# Patient Record
Sex: MALE | Race: WHITE
[De-identification: names, ages, dates, MRNs, and addresses within clinical notes are randomized per-mention and may not be internally consistent; named-entity substitution may affect disease eponyms.]

---

## 2018-05-14 ENCOUNTER — HOSPITAL ENCOUNTER (OUTPATIENT)
Dept: HOSPITAL 61 - SLPLAB | Age: 48
Discharge: HOME | End: 2018-05-14
Attending: FAMILY MEDICINE
Payer: COMMERCIAL

## 2018-05-14 DIAGNOSIS — G47.33: Primary | ICD-10-CM

## 2018-05-14 DIAGNOSIS — R06.83: ICD-10-CM

## 2018-05-14 PROCEDURE — 95810 POLYSOM 6/> YRS 4/> PARAM: CPT

## 2018-05-14 RX ADMIN — OXYMETAZOLINE HYDROCHLORIDE 1 SPRAY: 5 SPRAY NASAL at 21:30

## 2021-09-01 ENCOUNTER — HOSPITAL ENCOUNTER (EMERGENCY)
Dept: HOSPITAL 61 - ER | Age: 51
Discharge: HOME | End: 2021-09-01
Payer: COMMERCIAL

## 2021-09-01 VITALS — HEIGHT: 68 IN | WEIGHT: 280.43 LBS | BODY MASS INDEX: 42.5 KG/M2

## 2021-09-01 VITALS — DIASTOLIC BLOOD PRESSURE: 70 MMHG | SYSTOLIC BLOOD PRESSURE: 138 MMHG

## 2021-09-01 DIAGNOSIS — R31.9: Primary | ICD-10-CM

## 2021-09-01 DIAGNOSIS — R06.02: ICD-10-CM

## 2021-09-01 DIAGNOSIS — R10.32: ICD-10-CM

## 2021-09-01 DIAGNOSIS — R09.81: ICD-10-CM

## 2021-09-01 DIAGNOSIS — Z88.0: ICD-10-CM

## 2021-09-01 DIAGNOSIS — Z88.2: ICD-10-CM

## 2021-09-01 DIAGNOSIS — R50.9: ICD-10-CM

## 2021-09-01 LAB
ANION GAP SERPL CALC-SCNC: 8 MMOL/L (ref 6–14)
APTT PPP: (no result) S
BACTERIA #/AREA URNS HPF: 0 /HPF
BASOPHILS # BLD AUTO: 0 X10^3/UL (ref 0–0.2)
BASOPHILS NFR BLD: 1 % (ref 0–3)
BILIRUB UR QL STRIP: NEGATIVE
BUN SERPL-MCNC: 16 MG/DL (ref 8–26)
CALCIUM SERPL-MCNC: 8.5 MG/DL (ref 8.5–10.1)
CHLORIDE SERPL-SCNC: 99 MMOL/L (ref 98–107)
CO2 SERPL-SCNC: 29 MMOL/L (ref 21–32)
CREAT SERPL-MCNC: 2.2 MG/DL (ref 0.7–1.3)
EOSINOPHIL NFR BLD: 0 X10^3/UL (ref 0–0.7)
EOSINOPHIL NFR BLD: 1 % (ref 0–3)
ERYTHROCYTE [DISTWIDTH] IN BLOOD BY AUTOMATED COUNT: 14 % (ref 11.5–14.5)
FIBRINOGEN PPP-MCNC: (no result) MG/DL
GFR SERPLBLD BASED ON 1.73 SQ M-ARVRAT: 31.8 ML/MIN
GLUCOSE SERPL-MCNC: 122 MG/DL (ref 70–99)
HCT VFR BLD CALC: 37.2 % (ref 39–53)
HGB BLD-MCNC: 13.1 G/DL (ref 13–17.5)
LYMPHOCYTES # BLD: 0.6 X10^3/UL (ref 1–4.8)
LYMPHOCYTES NFR BLD AUTO: 20 % (ref 24–48)
MCH RBC QN AUTO: 31 PG (ref 25–35)
MCHC RBC AUTO-ENTMCNC: 35 G/DL (ref 31–37)
MCV RBC AUTO: 87 FL (ref 79–100)
MONO #: 0.5 X10^3/UL (ref 0–1.1)
MONOCYTES NFR BLD: 15 % (ref 0–9)
NEUT #: 2 X10^3/UL (ref 1.8–7.7)
NEUTROPHILS NFR BLD AUTO: 64 % (ref 31–73)
NITRITE UR QL STRIP: NEGATIVE
PH UR STRIP: 6 [PH]
PLATELET # BLD AUTO: 156 X10^3/UL (ref 140–400)
POTASSIUM SERPL-SCNC: 4.3 MMOL/L (ref 3.5–5.1)
PROT UR STRIP-MCNC: 100 MG/DL
RBC # BLD AUTO: 4.28 X10^6/UL (ref 4.3–5.7)
RBC #/AREA URNS HPF: (no result) /HPF (ref 0–2)
SODIUM SERPL-SCNC: 136 MMOL/L (ref 136–145)
UROBILINOGEN UR-MCNC: 0.2 MG/DL
WBC # BLD AUTO: 3.2 X10^3/UL (ref 4–11)
WBC #/AREA URNS HPF: (no result) /HPF (ref 0–4)
YEAST #/AREA URNS HPF: PRESENT /HPF

## 2021-09-01 PROCEDURE — 87086 URINE CULTURE/COLONY COUNT: CPT

## 2021-09-01 PROCEDURE — 74176 CT ABD & PELVIS W/O CONTRAST: CPT

## 2021-09-01 PROCEDURE — 99285 EMERGENCY DEPT VISIT HI MDM: CPT

## 2021-09-01 PROCEDURE — 80048 BASIC METABOLIC PNL TOTAL CA: CPT

## 2021-09-01 PROCEDURE — 36415 COLL VENOUS BLD VENIPUNCTURE: CPT

## 2021-09-01 PROCEDURE — 85025 COMPLETE CBC W/AUTO DIFF WBC: CPT

## 2021-09-01 PROCEDURE — 81001 URINALYSIS AUTO W/SCOPE: CPT

## 2021-09-01 PROCEDURE — 96360 HYDRATION IV INFUSION INIT: CPT

## 2021-09-01 PROCEDURE — 71045 X-RAY EXAM CHEST 1 VIEW: CPT

## 2021-09-01 PROCEDURE — 96361 HYDRATE IV INFUSION ADD-ON: CPT

## 2021-09-01 NOTE — RAD
Site ID: [id number]



EXAMINATION: CT ABDOMEN+PELVIS WO.



Technique: Axial images with coronal and sagittal reconstructions are performed of abdomen and pelvis
 without contrast.



One or more of the following radiation dose reduction techniques was used: automated exposure control
, adjustment of mA and/or KV according to patient size, and/or utilization of iterative reconstructio
n technique.





HISTORY: 51 years  Male  Reason: abdominal pain, llq, hematuria 



COMPARISON:  None.



FINDINGS:

There is minimal focus of groundglass opacity in the inferior aspect of the lingula measuring 1.4 cm 
in size of uncertain etiology.



There is diffuse hepatic steatosis. The spleen is a slightly enlarged measuring 15 x 5.5 cm and 13 cm
 craniocaudally. The pancreas, and adrenals appear unremarkable. The gallbladder is not seen presumab
ly related to prior cholecystectomy. Correlate to surgical history.



The kidneys demonstrate no hydronephrosis. There are from 2 stones in the lower from pole of the righ
t kidney measuring up to 4 mm in size. The urinary bladder appear unremarkable. No left kidney or ure
teric stones.



There are sutures seen near the base of the cecum, presumably related to prior appendectomy.



The abdominal aorta is normal in caliber. No para-aortic significantly enlarged lymph nodes seen. No 
significant free fluid or fluid collection in the abdomen or pelvis noted.



There is an indeterminate soft tissue nodule seen anteriorly in the upper abdomen on the left side wi
th possible attachment to abdominal wall musculature. It has smooth margins and it is probably of duncan
ign nature. Mildly enlarged mesenteric lymph nodes up to 1 cm in short axis is seen of uncertain sign
ificance.



The osseous structures demonstrate no significant abnormality.



IMPRESSION:

1. Nonobstructive the lower pole right kidney stones up to 4 mm in size. No hydronephrosis.

2. Mild splenomegaly.

3. Hepatic steatosis.

4. Mild enlargement of mesenteric lymph nodes.

5. Indeterminant smoothly marginated soft tissue nodule in the upper anterior aspect of the abdomen o
n the left side.

6. Groundglass nodule measuring 1.4 cm in the lower from aspect of the lingula in the lung base.

8. Follow-up CT scan of the abdomen in 6 months is recommended to reevaluate the findings above is cantu
ggested.



Electronically signed by: Herson Lee MD (9/1/2021 3:16 PM) [id number]

## 2021-09-01 NOTE — RAD
AP chest.



HISTORY: Short of breath



AP view was taken of the chest. Heart is normal in size. There is no pleural effusion. There are no d
efinite infiltrates. The patient's taken a poor inspiration.



IMPRESSION:

1. Poor inspiration.

2. No acute infiltrates.



Electronically signed by: Conrad Delcid MD (9/1/2021 2:47 PM) [facility]

## 2021-09-01 NOTE — PHYS DOC
General Adult


EDM:


Chief Complaint:  BLOOD IN URINE





HPI:


HPI:


51-year-old male who was recently diagnosed with coronavirus presents to the 

emergency department complaining of hematuria.  He describes a hematuria as 

throughout his urinary stream, change in color from dark brown to purple 

intermittently.  He reports some mild pain with urination, he also complains of 

some left flank and left lower quadrant abdominal pain with the urination.  He 

also complains of fever, shortness of breath, congestion from his viral illness.

 He has not been vaccinated for Covid.  The patient denies nausea, vomiting, 

chest pain,  abdominal pain, urinary symptoms, stool change, recent trauma, or 

any other complaints.





Review of Systems:


Review of Systems:


ROS is otherwise negative except for what was mentioned in HPI





Heart Score:


C/O Chest Pain:  No





Current Medications:





Current Medications








 Medications


  (Trade)  Dose


 Ordered  Sig/Claudette  Start Time


 Stop Time Status Last Admin


Dose Admin


 


 Acetaminophen


  (Tylenol)  1,000 mg  1X  ONCE  9/1/21 14:15


 9/1/21 14:16 UNV  





 


 Morphine Sulfate


  (Morphine


 Sulfate)  5 mg  1X  ONCE  9/1/21 14:15


 9/1/21 14:16 UNV  





 


 Sodium Chloride  1,000 ml @ 


 1,000 mls/hr  1X  ONCE  9/1/21 14:15


 9/1/21 15:14 UNV  














Allergies:


Allergies:





Allergies








Coded Allergies Type Severity Reaction Last Updated Verified


 


  Penicillins Allergy Severe SHORTNESS OF BREATH 5/14/18 Yes


 


  Sulfa (Sulfonamide Antibiotics) Allergy Intermediate SWELLING, ITCHING 5/14/18

Yes











Physical Exam:


PE:


Constitutional: No acute distress, non-toxic appearance.  Warm to touch


HENT: Atraumatic, bilateral external ears normal, nose normal.


Eyes: PERRLA, EOMI, conjunctiva normal, no discharge.


Neck: Normal range of motion, supple, no stridor.


Cardiovascular: Heart rate regular rhythm. 2+ radial pulses


Lungs & Thorax: No respiratory distress, symmetrical expansion.


Abdomen: [Soft, left lower quadrant tenderness


Skin: Warm, dry.


Extremities: No tenderness, no cyanosis, ROM intact, no edema.


Neurologic: Alert and oriented X 3, normal motor function, normal sensory 

function, no focal deficits noted. Non ataxic gait. GCS 15.


Psychologic: Affect normal, judgment normal, mood normal.





Current Patient Data:


Labs:





Laboratory Tests








Test


 9/1/21


14:20 9/1/21


16:54


 


White Blood Count


 3.2 x10^3/uL


(4.0-11.0) 





 


Red Blood Count


 4.28 x10^6/uL


(4.30-5.70) 





 


Hemoglobin


 13.1 g/dL


(13.0-17.5) 





 


Hematocrit


 37.2 %


(39.0-53.0) 





 


Mean Corpuscular Volume 87 fL ()  


 


Mean Corpuscular Hemoglobin 31 pg (25-35)  


 


Mean Corpuscular Hemoglobin


Concent 35 g/dL


(31-37) 





 


Red Cell Distribution Width


 14.0 %


(11.5-14.5) 





 


Platelet Count


 156 x10^3/uL


(140-400) 





 


Neutrophils (%) (Auto) 64 % (31-73)  


 


Lymphocytes (%) (Auto) 20 % (24-48)  


 


Monocytes (%) (Auto) 15 % (0-9)  


 


Eosinophils (%) (Auto) 1 % (0-3)  


 


Basophils (%) (Auto) 1 % (0-3)  


 


Neutrophils # (Auto)


 2.0 x10^3/uL


(1.8-7.7) 





 


Lymphocytes # (Auto)


 0.6 x10^3/uL


(1.0-4.8) 





 


Monocytes # (Auto)


 0.5 x10^3/uL


(0.0-1.1) 





 


Eosinophils # (Auto)


 0.0 x10^3/uL


(0.0-0.7) 





 


Basophils # (Auto)


 0.0 x10^3/uL


(0.0-0.2) 





 


Sodium Level


 136 mmol/L


(136-145) 





 


Potassium Level


 4.3 mmol/L


(3.5-5.1) 





 


Chloride Level


 99 mmol/L


() 





 


Carbon Dioxide Level


 29 mmol/L


(21-32) 





 


Anion Gap 8 (6-14)  


 


Blood Urea Nitrogen


 16 mg/dL


(8-26) 





 


Creatinine


 2.2 mg/dL


(0.7-1.3) 





 


Estimated GFR


(Cockcroft-Gault) 31.8 


 





 


Glucose Level


 122 mg/dL


(70-99) 





 


Calcium Level


 8.5 mg/dL


(8.5-10.1) 





 


Urine Collection Type  Unknown 


 


Urine Color  Alva 


 


Urine Clarity  Cloudy 


 


Urine pH  6.0 (<5.0-8.0) 


 


Urine Specific Gravity


 


 1.020


(1.000-1.030)


 


Urine Protein


 


 100 mg/dL


(NEG-TRACE)


 


Urine Glucose (UA)


 


 Negative mg/dL


(NEG)


 


Urine Ketones (Stick)


 


 Trace mg/dL


(NEG)


 


Urine Blood  Large (NEG) 


 


Urine Nitrite  Negative (NEG) 


 


Urine Bilirubin  Negative (NEG) 


 


Urine Urobilinogen Dipstick


 


 0.2 mg/dL (0.2


mg/dL)


 


Urine Leukocyte Esterase  Small (NEG) 


 


Urine RBC


 


 Tntc /HPF


(0-2)


 


Urine WBC  1-4 /HPF (0-4) 


 


Urine Bacteria  0 /HPF (0-FEW) 


 


Urine Yeast  Present /HPF 








Vital Signs:





Vital Signs








  Date Time  Temp Pulse Resp B/P (MAP) Pulse Ox O2 Delivery O2 Flow Rate FiO2


 


9/1/21 16:00  94  154/78 (103) 95   


 


9/1/21 15:00  99  146/80 (102) 95   


 


9/1/21 14:00 102.5 104 20 129/79 95   





 102.5       











Radiology/Procedures:


Radiology/Procedures:


EXAMINATION: CT ABDOMEN+PELVIS WO.





Technique: Axial images with coronal and sagittal reconstructions are performed 

of abdomen and pelvis without contrast.





One or more of the following radiation dose reduction techniques was used: 

automated exposure control, adjustment of mA and/or KV according to patient 

size, and/or utilization of iterative reconstruction technique.








HISTORY: 51 years  Male  Reason: abdominal pain, llq, hematuria 





COMPARISON:  None.





FINDINGS:


There is minimal focus of groundglass opacity in the inferior aspect of the 

lingula measuring 1.4 cm in size of uncertain etiology.





There is diffuse hepatic steatosis. The spleen is a slightly enlarged measuring 

15 x 5.5 cm and 13 cm craniocaudally. The pancreas, and adrenals appear 

unremarkable. The gallbladder is not seen presumably related to prior 

cholecystectomy. Correlate to surgical history.





The kidneys demonstrate no hydronephrosis. There are from 2 stones in the lower 

from pole of the right kidney measuring up to 4 mm in size. The urinary bladder 

appear unremarkable. No left kidney or ureteric stones.





There are sutures seen near the base of the cecum, presumably related to prior 

appendectomy.





The abdominal aorta is normal in caliber. No para-aortic significantly enlarged 

lymph nodes seen. No significant free fluid or fluid collection in the abdomen 

or pelvis noted.





There is an indeterminate soft tissue nodule seen anteriorly in the upper 

abdomen on the left side with possible attachment to abdominal wall musculature.

 It has smooth margins and it is probably of benign nature. Mildly enlarged 

mesenteric lymph nodes up to 1 cm in short axis is seen of uncertain 

significance.





The osseous structures demonstrate no significant abnormality.





IMPRESSION:


1. Nonobstructive the lower pole right kidney stones up to 4 mm in size. No 

hydronephrosis.


2. Mild splenomegaly.


3. Hepatic steatosis.


4. Mild enlargement of mesenteric lymph nodes.


5. Indeterminant smoothly marginated soft tissue nodule in the upper anterior 

aspect of the abdomen on the left side.


6. Groundglass nodule measuring 1.4 cm in the lower from aspect of the lingula 

in the lung base.


8. Follow-up CT scan of the abdomen in 6 months is recommended to reevaluate the

 findings above is suggested.





Electronically signed by: Herson Lee MD (9/1/2021 3:16 PM)





AP chest.





HISTORY: Short of breath





AP view was taken of the chest. Heart is normal in size. There is no pleural 

effusion. There are no definite infiltrates. The patient's taken a poor 

inspiration.





IMPRESSION:


1. Poor inspiration.


2. No acute infiltrates.





Electronically signed by: Conrad Delcid MD (9/1/2021 2:47 PM)





Course & Med Decision Making:


Course & Med Decision Making





Vital Signs








  Date Time  Temp Pulse Resp B/P (MAP) Pulse Ox O2 Delivery O2 Flow Rate FiO2


 


9/1/21 16:48 100.6       





 100.6       


 


9/1/21 16:00  94  154/78 (103) 95   


 


9/1/21 15:00  99  146/80 (102) 95   


 


9/1/21 14:00 102.5 104 20 129/79 95   





 102.5       











Patient's labs are as above including a creatinine of 2.2 GFR 31.  He had a 

large amount of  bloody appearing urine, but no cause was seen on CT.  Patient 

was offered admission to the hospital, he would rather follow-up outpatient with

 his doctor.  We will refer him to a urologist.  I urged him to return if he has

 any progressive symptoms or symptoms do not change.  His vitals are improved as

 above.  He otherwise has no further complaints.  He did mention that he has a 

history of chronic kidney disease, so this lab result today may very well be his

 baseline, although he does not know the number specifically.  He was advised to

 continue to hydrate at home.  He mentioned that he drinks lots of [employer] 

energy drinks and does not drink enough water at home.  He states that he will 

try to drink her water, follow-up with his physician in 48 hours and try to get 

repeat blood work as we discussed in 48 hours to compare to today's results





Departure


Departure


Impression:  


   Primary Impression:  


   Hematuria


Disposition:  01 HOME / SELF CARE / HOMELESS


Condition:  STABLE


Referrals:  


KARLEY LAMB MD (PCP)


Patient Instructions:  Hematuria, Adult





Additional Instructions:  


Please follow-up with a urologist as well as your primary care doctor.  You need

 to have blood work done in 48 hours to compare to today's results.  Your GFR 

was 31 and your creatinine was 2.2.  Please get repeat blood work to compare.  

Please drink plenty of water at home, avoid caffeinated drinks, and remember to 

stay hydrated.  You are offered admission to the hospital today but with shared 

decision-making we will attempt to manage her condition outpatient.  If your 

condition changes at all, you welcome to return to the emergency department for 

further care.








 Urology


Located in: [facility]


Address: [address] [place] [address] [address], [place] [address]


Phone: [phone]











GRACE ARREDONDO DO              Sep 1, 2021 14:20

## 2021-09-11 ENCOUNTER — HOSPITAL ENCOUNTER (INPATIENT)
Dept: HOSPITAL 61 - ER | Age: 51
LOS: 2 days | Discharge: HOME | DRG: 177 | End: 2021-09-13
Attending: INTERNAL MEDICINE | Admitting: INTERNAL MEDICINE
Payer: COMMERCIAL

## 2021-09-11 VITALS — WEIGHT: 263.45 LBS | BODY MASS INDEX: 39.93 KG/M2 | HEIGHT: 68 IN

## 2021-09-11 DIAGNOSIS — Z88.2: ICD-10-CM

## 2021-09-11 DIAGNOSIS — Z86.711: ICD-10-CM

## 2021-09-11 DIAGNOSIS — Z87.891: ICD-10-CM

## 2021-09-11 DIAGNOSIS — J96.01: ICD-10-CM

## 2021-09-11 DIAGNOSIS — Z91.013: ICD-10-CM

## 2021-09-11 DIAGNOSIS — Z79.01: ICD-10-CM

## 2021-09-11 DIAGNOSIS — Z91.041: ICD-10-CM

## 2021-09-11 DIAGNOSIS — J12.82: ICD-10-CM

## 2021-09-11 DIAGNOSIS — U07.1: Primary | ICD-10-CM

## 2021-09-11 DIAGNOSIS — Z90.49: ICD-10-CM

## 2021-09-11 DIAGNOSIS — Z86.718: ICD-10-CM

## 2021-09-11 DIAGNOSIS — G47.33: ICD-10-CM

## 2021-09-11 DIAGNOSIS — E66.01: ICD-10-CM

## 2021-09-11 DIAGNOSIS — Z88.0: ICD-10-CM

## 2021-09-11 LAB
ANION GAP SERPL CALC-SCNC: 10 MMOL/L (ref 6–14)
BASOPHILS # BLD AUTO: 0 X10^3/UL (ref 0–0.2)
BASOPHILS NFR BLD: 1 % (ref 0–3)
BUN SERPL-MCNC: 17 MG/DL (ref 8–26)
CALCIUM SERPL-MCNC: 8.6 MG/DL (ref 8.5–10.1)
CHLORIDE SERPL-SCNC: 103 MMOL/L (ref 98–107)
CO2 SERPL-SCNC: 27 MMOL/L (ref 21–32)
CREAT SERPL-MCNC: 1.7 MG/DL (ref 0.7–1.3)
EOSINOPHIL NFR BLD: 0.1 X10^3/UL (ref 0–0.7)
EOSINOPHIL NFR BLD: 3 % (ref 0–3)
ERYTHROCYTE [DISTWIDTH] IN BLOOD BY AUTOMATED COUNT: 14.1 % (ref 11.5–14.5)
GFR SERPLBLD BASED ON 1.73 SQ M-ARVRAT: 42.9 ML/MIN
GLUCOSE SERPL-MCNC: 81 MG/DL (ref 70–99)
HCT VFR BLD CALC: 34.3 % (ref 39–53)
HGB BLD-MCNC: 12.4 G/DL (ref 13–17.5)
LYMPHOCYTES # BLD: 1.2 X10^3/UL (ref 1–4.8)
LYMPHOCYTES NFR BLD AUTO: 28 % (ref 24–48)
MCH RBC QN AUTO: 30 PG (ref 25–35)
MCHC RBC AUTO-ENTMCNC: 36 G/DL (ref 31–37)
MCV RBC AUTO: 83 FL (ref 79–100)
MONO #: 0.4 X10^3/UL (ref 0–1.1)
MONOCYTES NFR BLD: 10 % (ref 0–9)
NEUT #: 2.6 X10^3/UL (ref 1.8–7.7)
NEUTROPHILS NFR BLD AUTO: 59 % (ref 31–73)
PLATELET # BLD AUTO: 231 X10^3/UL (ref 140–400)
POTASSIUM SERPL-SCNC: 3.4 MMOL/L (ref 3.5–5.1)
RBC # BLD AUTO: 4.11 X10^6/UL (ref 4.3–5.7)
SODIUM SERPL-SCNC: 140 MMOL/L (ref 136–145)
WBC # BLD AUTO: 4.4 X10^3/UL (ref 4–11)

## 2021-09-11 PROCEDURE — 87426 SARSCOV CORONAVIRUS AG IA: CPT

## 2021-09-11 PROCEDURE — 36415 COLL VENOUS BLD VENIPUNCTURE: CPT

## 2021-09-11 PROCEDURE — 96365 THER/PROPH/DIAG IV INF INIT: CPT

## 2021-09-11 PROCEDURE — 87040 BLOOD CULTURE FOR BACTERIA: CPT

## 2021-09-11 PROCEDURE — 85025 COMPLETE CBC W/AUTO DIFF WBC: CPT

## 2021-09-11 PROCEDURE — 83880 ASSAY OF NATRIURETIC PEPTIDE: CPT

## 2021-09-11 PROCEDURE — 96375 TX/PRO/DX INJ NEW DRUG ADDON: CPT

## 2021-09-11 PROCEDURE — 71045 X-RAY EXAM CHEST 1 VIEW: CPT

## 2021-09-11 PROCEDURE — 94618 PULMONARY STRESS TESTING: CPT

## 2021-09-11 PROCEDURE — 85379 FIBRIN DEGRADATION QUANT: CPT

## 2021-09-11 PROCEDURE — 93005 ELECTROCARDIOGRAM TRACING: CPT

## 2021-09-11 PROCEDURE — U0003 INFECTIOUS AGENT DETECTION BY NUCLEIC ACID (DNA OR RNA); SEVERE ACUTE RESPIRATORY SYNDROME CORONAVIRUS 2 (SARS-COV-2) (CORONAVIRUS DISEASE [COVID-19]), AMPLIFIED PROBE TECHNIQUE, MAKING USE OF HIGH THROUGHPUT TECHNOLOGIES AS DESCRIBED BY CMS-2020-01-R: HCPCS

## 2021-09-11 PROCEDURE — 80048 BASIC METABOLIC PNL TOTAL CA: CPT

## 2021-09-11 PROCEDURE — 96361 HYDRATE IV INFUSION ADD-ON: CPT

## 2021-09-11 PROCEDURE — G0378 HOSPITAL OBSERVATION PER HR: HCPCS

## 2021-09-11 PROCEDURE — 83735 ASSAY OF MAGNESIUM: CPT

## 2021-09-11 PROCEDURE — 84484 ASSAY OF TROPONIN QUANT: CPT

## 2021-09-11 PROCEDURE — 83605 ASSAY OF LACTIC ACID: CPT

## 2021-09-11 RX ADMIN — BACITRACIN ONE MLS/HR: 5000 INJECTION, POWDER, FOR SOLUTION INTRAMUSCULAR at 01:30

## 2021-09-11 NOTE — RAD
EXAMINATION: Chest radiograph.



VIEWS: Single AP view of the chest



COMPARISON: 9/1/2021



INDICATION:51 years, Male, COVID , shortness of breath.



FINDINGS: 

Normal cardiomediastinal silhouette. Decreased lung volumes. Right perihilar linear opacities. Left b
asilar hazy airspace opacity. No pleural effusion or pneumothorax. No acute osseous process.



IMPRESSION:



1. Left basilar airspace opacity favoring pneumonia.

2. Right perihilar opacities may represent subsegmental atelectasis and/or developing infiltrate.



Electronically signed by: Elvis Balubena DO (9/11/2021 11:03 PM) [facility]

## 2021-09-11 NOTE — PHYS DOC
Past Medical History


Additional Past Medical Histor:  PE


Past Surgical History:  Appendectomy


Additional Past Surgical Histo:  PLASTIC SURGERY ON EARS


Smoking Status:  Former Smoker


Alcohol Use:  Rarely





General Adult


EDM:


Chief Complaint:  Shortness of breath





HPI:


HPI:


This is a 31-year-old male who recently was diagnosed with Covid presents to the

emergency complaining of increasing shortness of breath and chest pain when he 

breathes in deeply.  He has a history of pulmonary embolism in the past.  He 

reports that he wears a CPAP regularly at night and he has having more shortness

of breath than usual with the CPAP machine.  Endorses mild cough the patient 

denies nausea, vomiting, fever, chills, abdominal pain, urinary symptoms,  

recent trauma, or any other complaints.





Review of Systems:


Review of Systems:


ROS is otherwise negative except for what was mentioned in HPI





Heart Score:


C/O Chest Pain:  Yes


HEART Score for Chest Pain:  








HEART Score for Chest Pain Response (Comments) Value


 


History Moderately Suspicious 1


 


ECG Normal 0


 


Age >45 - < 65 1


 


Risk Factors                            1 or 2 Risk Factors 1


 


Total  3











Allergies:


Allergies:





Allergies








Coded Allergies Type Severity Reaction Last Updated Verified


 


  Penicillins Allergy Severe SHORTNESS OF BREATH 5/14/18 Yes


 


  Sulfa (Sulfonamide Antibiotics) Allergy Intermediate SWELLING, ITCHING 5/14/18

Yes


 


Uncoded Allergies Type Severity Reaction Last Updated Verified


 


  SHELLFISH Allergy Unknown  9/1/21 











Physical Exam:


PE:


Constitutional: No acute distress, non-toxic appearance.


HENT: Atraumatic, bilateral external ears normal, nose normal.


Eyes: PERRLA, EOMI, conjunctiva normal, no discharge.


Neck: Normal range of motion, supple, no stridor.


Cardiovascular: Heart rate regular rhythm. 2+ radial pulses


Lungs & Thorax: No respiratory distress, symmetrical expansion. 


Abdomen: Soft, no tenderness


Skin: Warm, dry.


Extremities: No tenderness, no cyanosis, ROM intact, no edema.


Neurologic: Alert and oriented X 3, normal motor function, normal sensory 

function, no focal deficits noted. Non ataxic gait. GCS 15.


Psychologic: Affect normal, judgment normal, mood normal.





Current Patient Data:


Labs:





Laboratory Tests








Test


 9/11/21


22:20 9/11/21


22:26


 


SARS-CoV-2 Antigen (Rapid)


 Negative


(NEGATIVE) 





 


White Blood Count


 


 4.4 x10^3/uL


(4.0-11.0)


 


Red Blood Count


 


 4.11 x10^6/uL


(4.30-5.70)


 


Hemoglobin


 


 12.4 g/dL


(13.0-17.5)


 


Hematocrit


 


 34.3 %


(39.0-53.0)


 


Mean Corpuscular Volume  83 fL () 


 


Mean Corpuscular Hemoglobin  30 pg (25-35) 


 


Mean Corpuscular Hemoglobin


Concent 


 36 g/dL


(31-37)


 


Red Cell Distribution Width


 


 14.1 %


(11.5-14.5)


 


Platelet Count


 


 231 x10^3/uL


(140-400)


 


Neutrophils (%) (Auto)  59 % (31-73) 


 


Lymphocytes (%) (Auto)  28 % (24-48) 


 


Monocytes (%) (Auto)  10 % (0-9) 


 


Eosinophils (%) (Auto)  3 % (0-3) 


 


Basophils (%) (Auto)  1 % (0-3) 


 


Neutrophils # (Auto)


 


 2.6 x10^3/uL


(1.8-7.7)


 


Lymphocytes # (Auto)


 


 1.2 x10^3/uL


(1.0-4.8)


 


Monocytes # (Auto)


 


 0.4 x10^3/uL


(0.0-1.1)


 


Eosinophils # (Auto)


 


 0.1 x10^3/uL


(0.0-0.7)


 


Basophils # (Auto)


 


 0.0 x10^3/uL


(0.0-0.2)


 


D-Dimer (Enriqueta)


 


 < 0.27


ug/mlFEU


 


Sodium Level


 


 140 mmol/L


(136-145)


 


Potassium Level


 


 3.4 mmol/L


(3.5-5.1)


 


Chloride Level


 


 103 mmol/L


()


 


Carbon Dioxide Level


 


 27 mmol/L


(21-32)


 


Anion Gap  10 (6-14) 


 


Blood Urea Nitrogen


 


 17 mg/dL


(8-26)


 


Creatinine


 


 1.7 mg/dL


(0.7-1.3)


 


Estimated GFR


(Cockcroft-Gault) 


 42.9 





 


Glucose Level


 


 81 mg/dL


(70-99)


 


Calcium Level


 


 8.6 mg/dL


(8.5-10.1)


 


Troponin I Quantitative


 


 < 0.017 ng/mL


(0.000-0.055)


 


NT-Pro-B-Type Natriuretic


Peptide 


 35 pg/mL


(0-124)








Vital Signs:





Vital Signs








  Date Time  Temp Pulse Resp B/P (MAP) Pulse Ox O2 Delivery O2 Flow Rate FiO2


 


9/11/21 21:50 98.7 91 16 130/82 (98) 95 Room Air  





 98.7       











EKG:


EKG:


Normal sinus rhythm rate of 91, no ST-T wave changes, no ectopic beats, normal 

axis, normal IL, QRS, and QTc intervals. Impression: Normal EKG. interpreted by 

me, Alvarado Heller D.O.





Radiology/Procedures:


Radiology/Procedures:


EXAMINATION: Chest radiograph.





VIEWS: Single AP view of the chest





COMPARISON: 9/1/2021





INDICATION:51 years, Male, COVID , shortness of breath.





FINDINGS: 


Normal cardiomediastinal silhouette. Decreased lung volumes. Right perihilar 

linear opacities. Left basilar hazy airspace opacity. No pleural effusion or 

pneumothorax. No acute osseous process.





IMPRESSION:





1. Left basilar airspace opacity favoring pneumonia.


2. Right perihilar opacities may represent subsegmental atelectasis and/or 

developing infiltrate.





Course & Med Decision Making:


Course & Med Decision Making


Patient with recent Covid diagnosis, he has pneumonia on his chest x-ray today, 

we will admit him to the hospital for further care and IV antibiotics.  Patient 

will be admitted to Dr. Fuller.  Patient amenable to this plan.  He uses CPAP at

night as per regular.








My Orders - ALVARADO HELLER DO








Procedure Category Date Status





  Time 


 


Cbc W Autodiff LAB 9/11/21 Complete





  22:13 


 


Basic Metabolic Panel LAB 9/11/21 Complete





  22:13 


 


D-Dimer LAB 9/11/21 Complete





  22:13 


 


Troponini LAB 9/11/21 Complete





  22:13 


 


Nt-Pro Bnp LAB 9/11/21 Complete





  22:13 


 


Chest Ap Only RAD 9/11/21 Resulted





  22:20 


 


Sars Cov2 ([place]) LAB 9/11/21 In Process





  22:15 


 


Sars Antigen [place]a Rapid LAB 9/11/21 Complete





  22:15 


 


Ceftriaxone Iv Push PHA 9/11/21 Complete





 (Rocephin)  23:30 


 


Azithrmycn 500mg Ivpb PHA 9/11/21 In Process





For Omni (Zithroma  23:30 


 


Blood Culture ROMARIO 9/11/21 Logged





  23:12 


 


Lactic Acid With LAB 9/11/21 Logged





Reflex  23:12 


 


Iv Normal Saline PHA 9/11/21 In Process





1000ml Bag (Iv Sodium  23:30 


 


Iv Normal Saline PHA 9/11/21 In Process





1000ml Bag (Iv Sodium  23:30 











Departure


Departure


Impression:  


   Primary Impression:  


   Pneumonia


Disposition:  09 ADMITTED AS INPATIENT


Admitting Physician:  HIMS ([place])


Condition:  STABLE


Referrals:  


KARLEY LAMB MD (PCP)











ALVARADO HELLER DO             Sep 11, 2021 22:01

## 2021-09-12 VITALS — SYSTOLIC BLOOD PRESSURE: 127 MMHG | DIASTOLIC BLOOD PRESSURE: 85 MMHG

## 2021-09-12 VITALS — SYSTOLIC BLOOD PRESSURE: 127 MMHG | DIASTOLIC BLOOD PRESSURE: 81 MMHG

## 2021-09-12 VITALS — DIASTOLIC BLOOD PRESSURE: 95 MMHG | SYSTOLIC BLOOD PRESSURE: 145 MMHG

## 2021-09-12 VITALS — SYSTOLIC BLOOD PRESSURE: 143 MMHG | DIASTOLIC BLOOD PRESSURE: 92 MMHG

## 2021-09-12 VITALS — SYSTOLIC BLOOD PRESSURE: 138 MMHG | DIASTOLIC BLOOD PRESSURE: 92 MMHG

## 2021-09-12 VITALS — SYSTOLIC BLOOD PRESSURE: 129 MMHG | DIASTOLIC BLOOD PRESSURE: 89 MMHG

## 2021-09-12 RX ADMIN — PANTOPRAZOLE SODIUM SCH MG: 40 TABLET, DELAYED RELEASE ORAL at 10:49

## 2021-09-12 RX ADMIN — OXYCODONE HYDROCHLORIDE AND ACETAMINOPHEN PRN TAB: 5; 325 TABLET ORAL at 17:29

## 2021-09-12 RX ADMIN — DOXYCYCLINE HYCLATE SCH MG: 100 TABLET, COATED ORAL at 10:50

## 2021-09-12 RX ADMIN — DEXAMETHASONE SCH MG: 4 TABLET ORAL at 12:12

## 2021-09-12 RX ADMIN — BACITRACIN ONE MLS/HR: 5000 INJECTION, POWDER, FOR SOLUTION INTRAMUSCULAR at 01:30

## 2021-09-12 RX ADMIN — OXYCODONE HYDROCHLORIDE AND ACETAMINOPHEN PRN TAB: 5; 325 TABLET ORAL at 12:13

## 2021-09-12 RX ADMIN — DOXYCYCLINE HYCLATE SCH MG: 100 TABLET, COATED ORAL at 21:01

## 2021-09-12 RX ADMIN — RIVAROXABAN SCH MG: 10 TABLET, FILM COATED ORAL at 17:28

## 2021-09-12 RX ADMIN — LISINOPRIL SCH MG: 10 TABLET ORAL at 10:50

## 2021-09-12 RX ADMIN — CETIRIZINE HYDROCHLORIDE SCH MG: 10 TABLET, FILM COATED ORAL at 10:49

## 2021-09-12 RX ADMIN — SENNOSIDES AND DOCUSATE SODIUM SCH TAB: 8.6; 5 TABLET ORAL at 21:01

## 2021-09-12 NOTE — CONS
DATE OF CONSULTATION: 09/12/2021

PULMONARY CONSULTATION



ATTENDING PHYSICIAN:  Manuela Fuller DO.



REASON FOR CONSULTATION:  Respiratory failure, COVID-19 pneumonia.



HISTORY OF PRESENT ILLNESS:  The patient is a 51-year-old obese male with a BMI 

of 40.  The patient has no significant history of tobacco use.  He does have 

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  He has been on 

Xarelto lifelong.



He was brought into the hospital as he was tested positive for COVID on the 

09/01.  He started to have shortness of breath and some nonspecific chest pain. 

The patient has history of sleep apnea for which he uses CPAP at nighttime.  He 

denies any tobacco use.  He was seen in the emergency room where a chest x-ray 

revealed bilateral faint interstitial infiltrates with more dominant in the left

base.  He was tested COVID negative by rapid test.  Creatinine was 1.7.  The 

patient feels better.  He is requiring oxygen at 2 liters.



PAST MEDICAL HISTORY:  Significant for history of pulmonary embolism and DVT, 

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  He has been on Xarelto 

since then.  Sleep apnea.



PAST SURGICAL HISTORY:  Appendectomy and plastic surgery on ears.



ALLERGIES:  PENICILLIN, IODINE, SHELLFISH DERIVED AND SULFA.



REVIEW OF SYSTEMS:  A 12-point system obtained.  Pertinent positives discussed 

in my present illness, otherwise noncontributory.  All systems that were 

negative were reviewed as well.



MEDICATIONS:  Reviewed including antibiotic, Rocephin and doxycycline.



SOCIAL HISTORY:  Denies tobacco use.



FAMILY HISTORY:  Noncontributory to lungs.



PHYSICAL EXAMINATION:

VITAL SIGNS:  Reviewed.  Pulse ox 96%, afebrile.

GENERAL:  Visual exam done due to COVID-19.  No paradoxical breathing.  He is 

obese.

EXTREMITIES:  No edema or skin rash.



IMPRESSION:

1.  Acute hypoxic respiratory failure secondary to COVID-19 viral pneumonia.

2.  Abnormal chest x-ray consistent with COVID-19 viral pneumonia.  Cannot 

exclude superimposed bacterial pneumonia.

3.  Underlying obesity with a BMI of 40 and underlying obstructive sleep apnea. 

On home CPAP at nighttime.

3.  History of deep venous thrombosis and pulmonary embolism, which has been 

recurrent and is on lifelong anticoagulation with Xarelto.  Initially, it was in

2017 and then reoccurred in 2018.



RECOMMENDATIONS:

1.  Continue present oxygen.  Keep saturation 92 and above, currently down to 2 

liters.  Doing well.

2.  Continue empiric antibiotic.

3.  Continue Xarelto.

4.  Add steroids.

5.  Continue CPAP at bedtime.

6.  We will follow along with you.  Possible discharge in 48 hours.







AARON/MAGDY

DR: AARON/erica   DD: 09/12/2021 10:32

DT: 09/12/2021 10:52   TID: [id number]

## 2021-09-12 NOTE — NUR
Donavan just arrived from [facility] via bed With PNU,SOA and PUI. Was Covid + on 9/1. He is A/O x4 
and will make needs known.

## 2021-09-13 VITALS — SYSTOLIC BLOOD PRESSURE: 160 MMHG | DIASTOLIC BLOOD PRESSURE: 98 MMHG

## 2021-09-13 VITALS — DIASTOLIC BLOOD PRESSURE: 95 MMHG | SYSTOLIC BLOOD PRESSURE: 157 MMHG

## 2021-09-13 VITALS — DIASTOLIC BLOOD PRESSURE: 88 MMHG | SYSTOLIC BLOOD PRESSURE: 144 MMHG

## 2021-09-13 VITALS — DIASTOLIC BLOOD PRESSURE: 96 MMHG | SYSTOLIC BLOOD PRESSURE: 142 MMHG

## 2021-09-13 VITALS — DIASTOLIC BLOOD PRESSURE: 90 MMHG | SYSTOLIC BLOOD PRESSURE: 141 MMHG

## 2021-09-13 LAB
ANION GAP SERPL CALC-SCNC: 7 MMOL/L (ref 6–14)
BASOPHILS # BLD AUTO: 0 X10^3/UL (ref 0–0.2)
BASOPHILS NFR BLD: 0 % (ref 0–3)
BUN SERPL-MCNC: 11 MG/DL (ref 8–26)
CALCIUM SERPL-MCNC: 8.2 MG/DL (ref 8.5–10.1)
CHLORIDE SERPL-SCNC: 107 MMOL/L (ref 98–107)
CO2 SERPL-SCNC: 28 MMOL/L (ref 21–32)
CREAT SERPL-MCNC: 1.5 MG/DL (ref 0.7–1.3)
EOSINOPHIL NFR BLD: 0 % (ref 0–3)
EOSINOPHIL NFR BLD: 0 X10^3/UL (ref 0–0.7)
ERYTHROCYTE [DISTWIDTH] IN BLOOD BY AUTOMATED COUNT: 13.6 % (ref 11.5–14.5)
GFR SERPLBLD BASED ON 1.73 SQ M-ARVRAT: 49.5 ML/MIN
GLUCOSE SERPL-MCNC: 134 MG/DL (ref 70–99)
HCT VFR BLD CALC: 33.4 % (ref 39–53)
HGB BLD-MCNC: 11.9 G/DL (ref 13–17.5)
LYMPHOCYTES # BLD: 0.7 X10^3/UL (ref 1–4.8)
LYMPHOCYTES NFR BLD AUTO: 12 % (ref 24–48)
MAGNESIUM SERPL-MCNC: 1.6 MG/DL (ref 1.8–2.4)
MCH RBC QN AUTO: 30 PG (ref 25–35)
MCHC RBC AUTO-ENTMCNC: 36 G/DL (ref 31–37)
MCV RBC AUTO: 86 FL (ref 79–100)
MONO #: 0.3 X10^3/UL (ref 0–1.1)
MONOCYTES NFR BLD: 6 % (ref 0–9)
NEUT #: 4.6 X10^3/UL (ref 1.8–7.7)
NEUTROPHILS NFR BLD AUTO: 82 % (ref 31–73)
PLATELET # BLD AUTO: 240 X10^3/UL (ref 140–400)
POTASSIUM SERPL-SCNC: 3.9 MMOL/L (ref 3.5–5.1)
RBC # BLD AUTO: 3.9 X10^6/UL (ref 4.3–5.7)
SODIUM SERPL-SCNC: 142 MMOL/L (ref 136–145)
WBC # BLD AUTO: 5.6 X10^3/UL (ref 4–11)

## 2021-09-13 RX ADMIN — RIVAROXABAN SCH MG: 10 TABLET, FILM COATED ORAL at 16:16

## 2021-09-13 RX ADMIN — SENNOSIDES AND DOCUSATE SODIUM SCH TAB: 8.6; 5 TABLET ORAL at 12:12

## 2021-09-13 RX ADMIN — DOXYCYCLINE HYCLATE SCH MG: 100 TABLET, COATED ORAL at 09:47

## 2021-09-13 RX ADMIN — DEXAMETHASONE SCH MG: 4 TABLET ORAL at 09:48

## 2021-09-13 RX ADMIN — PANTOPRAZOLE SODIUM SCH MG: 40 TABLET, DELAYED RELEASE ORAL at 09:48

## 2021-09-13 RX ADMIN — LISINOPRIL SCH MG: 10 TABLET ORAL at 09:48

## 2021-09-13 RX ADMIN — CETIRIZINE HYDROCHLORIDE SCH MG: 10 TABLET, FILM COATED ORAL at 09:47

## 2021-09-13 NOTE — EKG
[facility]

              [address] [place] [address]

Test Date:    2021               Test Time:    21:58:22

Pat Name:     CALLI DIAMOND           Department:   

Patient ID:   [id number]           Room:         526 1

Gender:       M                        Technician:   

:          [id number]               Requested By: GRACE ARREDONDO

Order Number: [id number]           Reading MD:   Migel Salas

                                 Measurements

Intervals                              Axis          

Rate:         91                       P:            38

WA:           156                      QRS:          29

QRSD:         80                       T:            25

QT:           342                                    

QTc:          422                                    

                           Interpretive Statements

SINUS RHYTHM

NORMAL ECG

RI6.02

No previous ECG available for comparison

Electronically Signed On 2021 13:27:09 CDT by Migel Salas

## 2021-09-13 NOTE — NUR
Patient discharged home with self care, accompanied by self via private vehicle. Worsening 
symptoms, medications, activity, and new prescriptions reviewed with patient, verbalized 
understanding. Note sent home with patient to not return to work until 9/21 per Dr. Carter's 
instructions. COVID precautions reviewed with patient. IV removed and no telemetry ordered. 
Patient verbalized understanding to all information.

## 2021-09-13 NOTE — NUR
SW following. Discussed with RN, pt from home with roommate, 2L PRN, uses cpap at home, 
renal diet. COVID-19 positive. RN advised no SW needs at this time. SW will continue to 
follow.

## 2021-09-13 NOTE — PDOC3
Discharge Summary


Visit Information


Date of Admission:  Sep 11, 2021


Date of Discharge:  Sep 13, 2021


Admitting Diagnosis:  Pneumonia due to COVID 19


Final Diagnosis


Problems


Medical Problems:


(1) Pneumonia


Status: Acute  











Brief Hospital Course


Allergies





                                    Allergies








Coded Allergies Type Severity Reaction Last Updated Verified


 


  Penicillins Allergy Severe SHORTNESS OF BREATH 5/14/18 Yes


 


  iodine Allergy Severe  9/11/21 Yes


 


  shellfish derived Allergy Severe  9/11/21 Yes


 


  Sulfa (Sulfonamide Antibiotics) Allergy Intermediate SWELLING, ITCHING 5/14/18

Yes








Vital Signs





Vital Signs








  Date Time  Temp Pulse Resp B/P (MAP) Pulse Ox O2 Delivery O2 Flow Rate FiO2


 


9/13/21 15:00 98.8 60 18 141/90 (107) 97 Room Air  





 98.8       


 


9/13/21 13:52       2.0 








Lab Results





Laboratory Tests








Test


 9/11/21


22:20 9/11/21


22:26 9/12/21


00:10 9/13/21


10:05


 


SARS-CoV-2 RNA (BENITO)


 Positive


(Negative) 


 


 





 


SARS-CoV-2 Antigen (Rapid)


 Negative


(NEGATIVE) 


 


 





 


White Blood Count


 


 4.4 x10^3/uL


(4.0-11.0) 


 5.6 x10^3/uL


(4.0-11.0)


 


Red Blood Count


 


 4.11 x10^6/uL


(4.30-5.70) 


 3.90 x10^6/uL


(4.30-5.70)


 


Hemoglobin


 


 12.4 g/dL


(13.0-17.5) 


 11.9 g/dL


(13.0-17.5)


 


Hematocrit


 


 34.3 %


(39.0-53.0) 


 33.4 %


(39.0-53.0)


 


Mean Corpuscular Volume  83 fL ()   86 fL () 


 


Mean Corpuscular Hemoglobin  30 pg (25-35)   30 pg (25-35) 


 


Mean Corpuscular Hemoglobin


Concent 


 36 g/dL


(31-37) 


 36 g/dL


(31-37)


 


Red Cell Distribution Width


 


 14.1 %


(11.5-14.5) 


 13.6 %


(11.5-14.5)


 


Platelet Count


 


 231 x10^3/uL


(140-400) 


 240 x10^3/uL


(140-400)


 


Neutrophils (%) (Auto)  59 % (31-73)   82 % (31-73) 


 


Lymphocytes (%) (Auto)  28 % (24-48)   12 % (24-48) 


 


Monocytes (%) (Auto)  10 % (0-9)   6 % (0-9) 


 


Eosinophils (%) (Auto)  3 % (0-3)   0 % (0-3) 


 


Basophils (%) (Auto)  1 % (0-3)   0 % (0-3) 


 


Neutrophils # (Auto)


 


 2.6 x10^3/uL


(1.8-7.7) 


 4.6 x10^3/uL


(1.8-7.7)


 


Lymphocytes # (Auto)


 


 1.2 x10^3/uL


(1.0-4.8) 


 0.7 x10^3/uL


(1.0-4.8)


 


Monocytes # (Auto)


 


 0.4 x10^3/uL


(0.0-1.1) 


 0.3 x10^3/uL


(0.0-1.1)


 


Eosinophils # (Auto)


 


 0.1 x10^3/uL


(0.0-0.7) 


 0.0 x10^3/uL


(0.0-0.7)


 


Basophils # (Auto)


 


 0.0 x10^3/uL


(0.0-0.2) 


 0.0 x10^3/uL


(0.0-0.2)


 


D-Dimer (Enriqueta)


 


 < 0.27


ug/mlFEU 


 





 


Sodium Level


 


 140 mmol/L


(136-145) 


 142 mmol/L


(136-145)


 


Potassium Level


 


 3.4 mmol/L


(3.5-5.1) 


 3.9 mmol/L


(3.5-5.1)


 


Chloride Level


 


 103 mmol/L


() 


 107 mmol/L


()


 


Carbon Dioxide Level


 


 27 mmol/L


(21-32) 


 28 mmol/L


(21-32)


 


Anion Gap  10 (6-14)   7 (6-14) 


 


Blood Urea Nitrogen


 


 17 mg/dL


(8-26) 


 11 mg/dL


(8-26)


 


Creatinine


 


 1.7 mg/dL


(0.7-1.3) 


 1.5 mg/dL


(0.7-1.3)


 


Estimated GFR


(Cockcroft-Gault) 


 42.9 


 


 49.5 





 


Glucose Level


 


 81 mg/dL


(70-99) 


 134 mg/dL


(70-99)


 


Calcium Level


 


 8.6 mg/dL


(8.5-10.1) 


 8.2 mg/dL


(8.5-10.1)


 


Troponin I Quantitative


 


 < 0.017 ng/mL


(0.000-0.055) 


 





 


NT-Pro-B-Type Natriuretic


Peptide 


 35 pg/mL


(0-124) 


 





 


Lactic Acid Level


 


 


 0.8 mmol/L


(0.4-2.0) 





 


Magnesium Level


 


 


 


 1.6 mg/dL


(1.8-2.4)








Laboratory Tests








Test


 9/13/21


10:05


 


White Blood Count


 5.6 x10^3/uL


(4.0-11.0)


 


Red Blood Count


 3.90 x10^6/uL


(4.30-5.70)


 


Hemoglobin


 11.9 g/dL


(13.0-17.5)


 


Hematocrit


 33.4 %


(39.0-53.0)


 


Mean Corpuscular Volume 86 fL () 


 


Mean Corpuscular Hemoglobin 30 pg (25-35) 


 


Mean Corpuscular Hemoglobin


Concent 36 g/dL


(31-37)


 


Red Cell Distribution Width


 13.6 %


(11.5-14.5)


 


Platelet Count


 240 x10^3/uL


(140-400)


 


Neutrophils (%) (Auto) 82 % (31-73) 


 


Lymphocytes (%) (Auto) 12 % (24-48) 


 


Monocytes (%) (Auto) 6 % (0-9) 


 


Eosinophils (%) (Auto) 0 % (0-3) 


 


Basophils (%) (Auto) 0 % (0-3) 


 


Neutrophils # (Auto)


 4.6 x10^3/uL


(1.8-7.7)


 


Lymphocytes # (Auto)


 0.7 x10^3/uL


(1.0-4.8)


 


Monocytes # (Auto)


 0.3 x10^3/uL


(0.0-1.1)


 


Eosinophils # (Auto)


 0.0 x10^3/uL


(0.0-0.7)


 


Basophils # (Auto)


 0.0 x10^3/uL


(0.0-0.2)


 


Sodium Level


 142 mmol/L


(136-145)


 


Potassium Level


 3.9 mmol/L


(3.5-5.1)


 


Chloride Level


 107 mmol/L


()


 


Carbon Dioxide Level


 28 mmol/L


(21-32)


 


Anion Gap 7 (6-14) 


 


Blood Urea Nitrogen


 11 mg/dL


(8-26)


 


Creatinine


 1.5 mg/dL


(0.7-1.3)


 


Estimated GFR


(Cockcroft-Gault) 49.5 





 


Glucose Level


 134 mg/dL


(70-99)


 


Calcium Level


 8.2 mg/dL


(8.5-10.1)


 


Magnesium Level


 1.6 mg/dL


(1.8-2.4)








Brief Hospital Course


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

in 2018 on Xarelto lifelong, TARAS on CPAP, morbid obesity (BMI 40) who tested 

positive for COVID on the 09/01.  He started to have shortness of breath and 

some nonspecific chest pain. The patient has history of sleep apnea for which he

uses CPAP at nighttime.  He denies any tobacco use.  He was seen in the 

emergency room where a chest x-ray revealed bilateral faint interstitial 

infiltrates with more dominant in the left base.  He was tested COVID negative 

by rapid test.  Creatinine was 1.7. Admitted on 2L NCO2 with pulmonology 

consultation





9/13/2021: The patient feels better.  He is requiring oxygen at 2 liters. Cr 

1.5, mg 1.6.  He is asking when he can go home and go back to work.  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





On 6 minute walk does not require O2 at rest or on exertion





Consults: Pulmonology





Problem list:


Acute hypoxic respiratory failure secondary to COVID-19 viral pneumonia. 

Resolved with steroids, did not receive remdesivir


Abnormal chest x-ray consistent with COVID-19 viral pneumonia.  Cannot exclude 

superimposed bacterial pneumonia. Will treat for bilateral pneumonia likely gram

negative given hx


Underlying obesity with a BMI of 40


Obstructive sleep apnea. On home CPAP at nighttime.


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 

is on lifelong anticoagulation with Xarelto.  Initially, it was in 2017 and then

reoccurred in 2018.





Greater than 30 minutes spent on d/c to home with self care on doxycycline and 

decadron 7 days.





Discharge Information


Condition at Discharge:  Improved


Follow Up:  Weeks (1)


Disposition/Orders:  D/C to Home


Scheduled


Aspirin (Aspirin Ec) 81 Mg Tablet., 1 TAB PO DAILY for COVID19 for 30 Days, 

#30 Ref 0


   Prescribed by: JOAQUIN JEAN-BAPTISTE MD on 9/13/21 1534


Dexamethasone (Decadron) 4 Mg Tablet, 4 MG PO DAILYWBKFT for COVID 19 for 7 

Days, #7


   Prescribed by: JOAQUIN JEAN-BAPTISTE MD on 9/13/21 1531


Doxycycline Hyclate (Doxycycline Hyclate) 100 Mg Tablet, 100 MG PO BID for 

Pneumonia for 7 Days, #14


   Prescribed by: JOAQUIN JEAN-BAPTISTE MD on 9/13/21 1531


Lisinopril (Lisinopril) 10 Mg Tablet, 10 MG PO DAILY for FOR HYPERTENSION, #30 

Ref 0 (Reported)


   Entered as Reported by: MANJULA FLORES on 9/12/21 0325


   Last Action: Continued on 9/12/21 0910 by JOAQUIN GILLIAM MD


Loratadine (Loratadine) 10 Mg Tablet, 1 TAB PO DAILY for ., #30 Ref 5 (Reported)


   Entered as Reported by: MANJULA FLORES on 9/12/21 0325


   Last Action: Converted on 9/12/21 0910 by JOAQUIN GILLIAM MD


Omeprazole (Omeprazole) 20 Mg Tablet.dr, 1 TAB PO DAILY for ., #90 Ref 1 

(Reported)


   Entered as Reported by: MANJULA FLORES on 9/12/21 0325


   Last Action: Converted on 9/12/21 0910 by JOAQUIN GILLIAM MD


Rivaroxaban (Xarelto) 20 Mg Tablet, 20 MG PO DAILY, (Reported)


   Entered as Reported by: Tashia Alejo RPH on 9/12/21 0928


   Last Action: New Order on 9/12/21 0928 by Tashia Alejo RPH


Zinc (Zinc) 50 Mg Tablet, 1 TAB PO DAILY for COVID 19 for 30 Days, #30 Ref 0


   Prescribed by: JOAQUIN JEAN-BAPTISTE MD on 9/13/21 1534





Discontinued Medications


Rivaroxaban (Xarelto) 10 Mg Tablet, 1 TAB PO DAILY for . for 10 Days, #10 Ref 0 

(Reported)


   Discontinued Reason: Prescription changed


   Entered as Reported by: MANJULA FLORES on 9/12/21 0325


   Last Action: Continued on 9/12/21 0910 by JOAQUIN GILLIAM MD





Justicifation of Admission Dx:


Justifications for Admission:


Justification of Admission Dx:  Yes











JOAQUIN JEAN-BAPTISTE MD        Sep 13, 2021 18:38

## 2021-09-13 NOTE — PDOC
[facility] PROGRESS NOTE


Date of Service


DOS:


DATE: 9/13/21 


TIME: 12:34





Chief Complaint


Chief Complaint


A/P:


Acute hypoxic respiratory failure secondary to COVID-19 viral pneumonia.


Abnormal chest x-ray consistent with COVID-19 viral pneumonia.  Cannot exclude 

superimposed bacterial pneumonia. Will treat for bilateral pneumonia likely gram

negative given hx


Underlying obesity with a BMI of 40


Obstructive sleep apnea. On home CPAP at nighttime.


History of deep venous thrombosis and pulmonary embolism, which has been 

recurrent and is on lifelong anticoagulation with Xarelto.  Initially, it was in

2017 and then reoccurred in 2018.





FEN - General diet


PPX - xarelto


FULL CODE


Dispo - inpatient





History of Present Illness


History of Present Illness


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

in 2018 on Xarelto lifelong, TARAS on CPAP, morbid obesity (BMI 40) who tested 

positive for COVID on the 09/01.  He started to have shortness of breath and 

some nonspecific chest pain. The patient has history of sleep apnea for which he

uses CPAP at nighttime.  He denies any tobacco use.  He was seen in the 

emergency room where a chest x-ray revealed bilateral faint interstitial 

infiltrates with more dominant in the left base.  He was tested COVID negative 

by rapid test.  Creatinine was 1.7. Admitted on 2L NCO2 with pulmonology 

consultation





The patient feels better.  He is requiring oxygen at 2 liters. Cr 1.5, mg 1.6.  

He is asking when he can go home and go back to work.  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





Vitals/I&O


Vitals/I&O:





                                   Vital Signs








  Date Time  Temp Pulse Resp B/P (MAP) Pulse Ox O2 Delivery O2 Flow Rate FiO2


 


9/13/21 09:48  73  160/98    


 


9/13/21 07:00 98.3  17  99 Nasal Cannula 2.0 





 98.3       














                                    I & O   


 


 9/12/21 9/12/21 9/13/21





 15:00 23:00 07:00


 


Output Total 250 ml  700 ml


 


Balance -250 ml  -700 ml











Physical Exam


General:  Alert, Oriented X3, Cooperative


Heart:  Regular rate, Normal S1, Normal S2


Lungs:  Wheezing


Abdomen:  Normal bowel sounds, Soft


Extremities:  No clubbing, No cyanosis


Skin:  No rashes, No breakdown





Labs


Labs:





Laboratory Tests








Test


 9/13/21


10:05


 


White Blood Count


 5.6 x10^3/uL


(4.0-11.0)


 


Red Blood Count


 3.90 x10^6/uL


(4.30-5.70)


 


Hemoglobin


 11.9 g/dL


(13.0-17.5)


 


Hematocrit


 33.4 %


(39.0-53.0)


 


Mean Corpuscular Volume 86 fL () 


 


Mean Corpuscular Hemoglobin 30 pg (25-35) 


 


Mean Corpuscular Hemoglobin


Concent 36 g/dL


(31-37)


 


Red Cell Distribution Width


 13.6 %


(11.5-14.5)


 


Platelet Count


 240 x10^3/uL


(140-400)


 


Neutrophils (%) (Auto) 82 % (31-73) 


 


Lymphocytes (%) (Auto) 12 % (24-48) 


 


Monocytes (%) (Auto) 6 % (0-9) 


 


Eosinophils (%) (Auto) 0 % (0-3) 


 


Basophils (%) (Auto) 0 % (0-3) 


 


Neutrophils # (Auto)


 4.6 x10^3/uL


(1.8-7.7)


 


Lymphocytes # (Auto)


 0.7 x10^3/uL


(1.0-4.8)


 


Monocytes # (Auto)


 0.3 x10^3/uL


(0.0-1.1)


 


Eosinophils # (Auto)


 0.0 x10^3/uL


(0.0-0.7)


 


Basophils # (Auto)


 0.0 x10^3/uL


(0.0-0.2)


 


Sodium Level


 142 mmol/L


(136-145)


 


Potassium Level


 3.9 mmol/L


(3.5-5.1)


 


Chloride Level


 107 mmol/L


()


 


Carbon Dioxide Level


 28 mmol/L


(21-32)


 


Anion Gap 7 (6-14) 


 


Blood Urea Nitrogen


 11 mg/dL


(8-26)


 


Creatinine


 1.5 mg/dL


(0.7-1.3)


 


Estimated GFR


(Cockcroft-Gault) 49.5 





 


Glucose Level


 134 mg/dL


(70-99)


 


Calcium Level


 8.2 mg/dL


(8.5-10.1)


 


Magnesium Level


 1.6 mg/dL


(1.8-2.4)











Assessment and Plan


Assessmemt and Plan


Problems


Medical Problems:


(1) Pneumonia


Status: Acute  











Comment


Review of Relevant


I have reviewed the following items sierra (where applicable) has been applied.


Medications:





Current Medications








 Medications


  (Trade)  Dose


 Ordered  Sig/Claudette


 Route


 PRN Reason  Start Time


 Stop Time Status Last Admin


Dose Admin


 


 Ceftriaxone Sodium


  (Rocephin)  1 gm  Q24H


 IVP


   9/12/21 21:00


    9/12/21 21:01





 


 Rivaroxaban


  (Xarelto)  20 mg  DAILYWSUP


 PO


   9/12/21 17:00


    9/12/21 17:28





 


 Senna/Docusate


 Sodium


  (Senna Plus)  1 tab  BID


 PO


   9/12/21 21:00


    9/13/21 12:12














Justifications for Admission


Other Justification














JOAQUIN JEAN-BAPTISTE MD        Sep 13, 2021 12:37

## 2021-10-02 ENCOUNTER — HOSPITAL ENCOUNTER (EMERGENCY)
Dept: HOSPITAL 61 - ER | Age: 51
LOS: 1 days | Discharge: HOME | End: 2021-10-03
Payer: COMMERCIAL

## 2021-10-02 VITALS — WEIGHT: 256.62 LBS | BODY MASS INDEX: 40.28 KG/M2 | HEIGHT: 67 IN

## 2021-10-02 DIAGNOSIS — R60.0: Primary | ICD-10-CM

## 2021-10-02 DIAGNOSIS — Z20.822: ICD-10-CM

## 2021-10-02 DIAGNOSIS — Z88.8: ICD-10-CM

## 2021-10-02 DIAGNOSIS — Z88.0: ICD-10-CM

## 2021-10-02 DIAGNOSIS — G89.29: ICD-10-CM

## 2021-10-02 DIAGNOSIS — Z88.2: ICD-10-CM

## 2021-10-02 DIAGNOSIS — Z91.013: ICD-10-CM

## 2021-10-02 PROCEDURE — 87426 SARSCOV CORONAVIRUS AG IA: CPT

## 2021-10-02 PROCEDURE — 93005 ELECTROCARDIOGRAM TRACING: CPT

## 2021-10-02 PROCEDURE — 93971 EXTREMITY STUDY: CPT

## 2021-10-02 PROCEDURE — 36415 COLL VENOUS BLD VENIPUNCTURE: CPT

## 2021-10-02 PROCEDURE — 83880 ASSAY OF NATRIURETIC PEPTIDE: CPT

## 2021-10-02 PROCEDURE — 99285 EMERGENCY DEPT VISIT HI MDM: CPT

## 2021-10-02 PROCEDURE — 71045 X-RAY EXAM CHEST 1 VIEW: CPT

## 2021-10-02 PROCEDURE — U0003 INFECTIOUS AGENT DETECTION BY NUCLEIC ACID (DNA OR RNA); SEVERE ACUTE RESPIRATORY SYNDROME CORONAVIRUS 2 (SARS-COV-2) (CORONAVIRUS DISEASE [COVID-19]), AMPLIFIED PROBE TECHNIQUE, MAKING USE OF HIGH THROUGHPUT TECHNOLOGIES AS DESCRIBED BY CMS-2020-01-R: HCPCS

## 2021-10-02 PROCEDURE — 83735 ASSAY OF MAGNESIUM: CPT

## 2021-10-02 PROCEDURE — 85025 COMPLETE CBC W/AUTO DIFF WBC: CPT

## 2021-10-02 PROCEDURE — 96365 THER/PROPH/DIAG IV INF INIT: CPT

## 2021-10-02 PROCEDURE — 84484 ASSAY OF TROPONIN QUANT: CPT

## 2021-10-02 PROCEDURE — 80053 COMPREHEN METABOLIC PANEL: CPT

## 2021-10-03 VITALS — SYSTOLIC BLOOD PRESSURE: 130 MMHG | DIASTOLIC BLOOD PRESSURE: 82 MMHG

## 2021-10-03 LAB
ALBUMIN SERPL-MCNC: 3.3 G/DL (ref 3.4–5)
ALBUMIN/GLOB SERPL: 0.9 {RATIO} (ref 1–1.7)
ALP SERPL-CCNC: 86 U/L (ref 46–116)
ALT SERPL-CCNC: 39 U/L (ref 16–63)
ANION GAP SERPL CALC-SCNC: 6 MMOL/L (ref 6–14)
AST SERPL-CCNC: 15 U/L (ref 15–37)
BASOPHILS # BLD AUTO: 0.1 X10^3/UL (ref 0–0.2)
BASOPHILS NFR BLD: 1 % (ref 0–3)
BILIRUB SERPL-MCNC: 0.5 MG/DL (ref 0.2–1)
BUN SERPL-MCNC: 17 MG/DL (ref 8–26)
BUN/CREAT SERPL: 12 (ref 6–20)
CALCIUM SERPL-MCNC: 8.4 MG/DL (ref 8.5–10.1)
CHLORIDE SERPL-SCNC: 102 MMOL/L (ref 98–107)
CO2 SERPL-SCNC: 31 MMOL/L (ref 21–32)
CREAT SERPL-MCNC: 1.4 MG/DL (ref 0.7–1.3)
EOSINOPHIL NFR BLD: 0.2 X10^3/UL (ref 0–0.7)
EOSINOPHIL NFR BLD: 4 % (ref 0–3)
ERYTHROCYTE [DISTWIDTH] IN BLOOD BY AUTOMATED COUNT: 14.9 % (ref 11.5–14.5)
GFR SERPLBLD BASED ON 1.73 SQ M-ARVRAT: 53.6 ML/MIN
GLUCOSE SERPL-MCNC: 114 MG/DL (ref 70–99)
HCT VFR BLD CALC: 35.5 % (ref 39–53)
HGB BLD-MCNC: 12.4 G/DL (ref 13–17.5)
LYMPHOCYTES # BLD: 1.6 X10^3/UL (ref 1–4.8)
LYMPHOCYTES NFR BLD AUTO: 25 % (ref 24–48)
MAGNESIUM SERPL-MCNC: 1.7 MG/DL (ref 1.8–2.4)
MCH RBC QN AUTO: 31 PG (ref 25–35)
MCHC RBC AUTO-ENTMCNC: 35 G/DL (ref 31–37)
MCV RBC AUTO: 88 FL (ref 79–100)
MONO #: 0.6 X10^3/UL (ref 0–1.1)
MONOCYTES NFR BLD: 10 % (ref 0–9)
NEUT #: 3.8 X10^3/UL (ref 1.8–7.7)
NEUTROPHILS NFR BLD AUTO: 60 % (ref 31–73)
PLATELET # BLD AUTO: 109 X10^3/UL (ref 140–400)
POTASSIUM SERPL-SCNC: 4 MMOL/L (ref 3.5–5.1)
PROT SERPL-MCNC: 7 G/DL (ref 6.4–8.2)
RBC # BLD AUTO: 4.03 X10^6/UL (ref 4.3–5.7)
SODIUM SERPL-SCNC: 139 MMOL/L (ref 136–145)
WBC # BLD AUTO: 6.4 X10^3/UL (ref 4–11)

## 2021-10-03 NOTE — PHYS DOC
Past Medical History


Additional Past Medical Histor:  ALLERGIES, HEADACHE, CHRONIC NECK PAIN,PE X2, 

COVID-19


Past Surgical History:  Appendectomy


Additional Past Surgical Histo:  PLASTIC SURGERY ON EARS


Smoking Status:  Former Smoker


Alcohol Use:  Rarely





General Adult


EDM:


Chief Complaint:  LOWER EXTREMITY SWELLING





HPI:


HPI:








                                        


Patient is a 51-year-old male with a history of DVT, PE in 2017 and then 

reoccurred in 2018, on Xarelto lifelong, history of obstructive sleep apnea on 

CPAP at night.  Patient is morbidly obese who was tested positive for COVID-19 

on 2021.  Patient was admitted here for pneumonia and discharged 

home recently.  [facility] bilateral lower 

extremity swelling, much worse on the left side and right side.  Patient also 

complained of trouble breathing with exertion.  Patient denies any cough or 

fever.  Patient says he was on some water pill but ran out recently.  Patient 

said he has been elevated his legs and the swelling subsided significantly.





Review of Systems:


Review of Systems:


Constitutional:   Denies fever or chills. []


Eyes:   Denies change in visual acuity. []


HENT:   Denies nasal congestion or sore throat. [] 


Respiratory:   Denies cough , positive for shortness of breath. [] 


Cardiovascular:   Denies chest pain or edema. [] 


GI:   Denies abdominal pain, nausea, vomiting, bloody stools or diarrhea. [] 


:  Denies dysuria. [] 


Musculoskeletal:   Denies back pain or joint pain. Positive for left leg 

swelling and pain


Integument:   Denies rash. [] 


Neurologic:   Denies headache, focal weakness or sensory changes. [] 


Endocrine:   Denies polyuria or polydipsia. [] 


Lymphatic:  Denies swollen glands. [] 


Psychiatric:  Denies depression or anxiety. []





Heart Score:


C/O Chest Pain:  N/A


Risk Factors:


Risk Factors:  DM, Current or recent (<one month) smoker, HTN, HLP, family 

history of CAD, obesity.


Risk Scores:


Score 0 - 3:  2.5% MACE over next 6 weeks - Discharge Home


Score 4 - 6:  20.3% MACE over next 6 weeks - Admit for Clinical Observation


Score 7 - 10:  72.7% MACE over next 6 weeks - Early Invasive Strategies





Allergies:


Allergies:





Allergies








Coded Allergies Type Severity Reaction Last Updated Verified


 


  Penicillins Allergy Severe SHORTNESS OF BREATH 18 Yes


 


  iodine Allergy Severe  21 Yes


 


  shellfish derived Allergy Severe  21 Yes


 


  Sulfa (Sulfonamide Antibiotics) Allergy Intermediate SWELLING, ITCHING 18

 Yes











Physical Exam:


PE:





Constitutional: Well developed, well nourished, no acute distress, non-toxic 

appearance. []


HENT: Normocephalic, atraumatic, bilateral external ears normal, oropharynx 

moist, no oral exudates, nose normal. []


Eyes: PERRLA, EOMI, conjunctiva normal, no discharge. [] 


Neck: Normal range of motion, no tenderness, supple, no stridor. [] 


Cardiovascular:Heart rate regular rhythm, no murmur []


Lungs & Thorax:  Bilateral breath sounds clear to auscultation []


Abdomen: Bowel sounds normal, soft, no tenderness, no masses, no pulsatile 

masses. [] 


Skin: Warm, dry, no erythema, no rash. [] 


Back: No tenderness, no CVA tenderness. [] 


Extremities: No tenderness, no cyanosis, no clubbing, ROM intact,  bilateral 

legs with pitting edema 2 plus, left leg is more swollen comparing to the right 

leg.


Neurologic: Alert and oriented X 3, normal motor function, normal sensory 

function, no focal deficits noted. []


Psychologic: Affect normal, judgement normal, mood normal. []





Current Patient Data:


Labs:





Laboratory Tests








Test


 10/3/21


00:15 10/3/21


00:16


 


SARS-CoV-2 Antigen (Rapid) Negative  


 


White Blood Count  6.4 x10^3/uL 


 


Red Blood Count  4.03 x10^6/uL 


 


Hemoglobin  12.4 g/dL 


 


Hematocrit  35.5 % 


 


Mean Corpuscular Volume  88 fL 


 


Mean Corpuscular Hemoglobin  31 pg 


 


Mean Corpuscular Hemoglobin


Concent 


 35 g/dL 





 


Red Cell Distribution Width  14.9 % 


 


Platelet Count  109 x10^3/uL 


 


Neutrophils (%) (Auto)  60 % 


 


Lymphocytes (%) (Auto)  25 % 


 


Monocytes (%) (Auto)  10 % 


 


Eosinophils (%) (Auto)  4 % 


 


Basophils (%) (Auto)  1 % 


 


Neutrophils # (Auto)  3.8 x10^3/uL 


 


Lymphocytes # (Auto)  1.6 x10^3/uL 


 


Monocytes # (Auto)  0.6 x10^3/uL 


 


Eosinophils # (Auto)  0.2 x10^3/uL 


 


Basophils # (Auto)  0.1 x10^3/uL 


 


Sodium Level  139 mmol/L 


 


Potassium Level  4.0 mmol/L 


 


Chloride Level  102 mmol/L 


 


Carbon Dioxide Level  31 mmol/L 


 


Anion Gap  6 


 


Blood Urea Nitrogen  17 mg/dL 


 


Creatinine  1.4 mg/dL 


 


Estimated GFR


(Cockcroft-Gault) 


 53.6 





 


BUN/Creatinine Ratio  12 


 


Glucose Level  114 mg/dL 


 


Calcium Level  8.4 mg/dL 


 


Magnesium Level  1.7 mg/dL 


 


Total Bilirubin  0.5 mg/dL 


 


Aspartate Amino Transf


(AST/SGOT) 


 15 U/L 





 


Alanine Aminotransferase


(ALT/SGPT) 


 39 U/L 





 


Alkaline Phosphatase  86 U/L 


 


Troponin I Quantitative  < 0.017 ng/mL 


 


NT-Pro-B-Type Natriuretic


Peptide 


 35 pg/mL 





 


Total Protein  7.0 g/dL 


 


Albumin  3.3 g/dL 


 


Albumin/Globulin Ratio  0.9 








Current Medications








 Medications


  (Trade)  Dose


 Ordered  Sig/Claudette


 Route


 PRN Reason  Start Time


 Stop Time Status Last Admin


Dose Admin


 


 Magnesium Sulfate/


 Dextrose  100 ml @ 


 100 mls/hr  1X  ONCE


 IV


   10/3/21 03:15


 10/3/21 04:14  10/3/21 03:14











Vital Signs:





                                   Vital Signs








  Date Time  Temp Pulse Resp B/P (MAP) Pulse Ox O2 Delivery O2 Flow Rate FiO2


 


10/2/21 23:52 97.9 104 16 158/94 (115) 96 Room Air  





 97.9       











EKG:


EKG:


EKG was done at 2358, heart rate 105 bpm, sinus tachycardia, no ST segment 

elevation





Radiology/Procedures:


Radiology/Procedures:


[][facility]


                    [address]  [place], [place] [id number] [phone]


                                        


                                 IMAGING REPORT





                                     Signed





PATIENT: CALLI DIAMOND ACCOUNT: [id number]     MRN#: [id number]


: 1970           LOCATION: ER              AGE: 51


SEX: M                    EXAM DT: 10/02/21         ACCESSION#: [id number]


STATUS: PRE ER            ORD. PHYSICIAN: CHARLEE MOTA DO


REASON: SOA


PROCEDURE: PORTABLE CHEST 1V





Single view chest dated 10/3/2021 12:20 AM:





COMPARISON: 2021





Clinical Indication: Shortness of breath.





Findings:





Single upright portable exam of the chest was performed. Heart and mediastinal 

contours are stable. There is some mild patchy and linear bibasilar opacity, 

somewhat improved from prior study. No pleural effusion. No pneumothorax.





IMPRESSION: 


Improving bibasilar airspace disease.





Electronically signed by: Santosh Maldonado MD (10/3/2021 12:21 AM) [facility]














DICTATED and SIGNED BY:     SANTOSH MALDONADO MD


DATE:     10/03/21 [id number] 0








                            [facility]


                    [address] [place] [address]


                                 [phone]


                                        


                                 IMAGING REPORT





                                     Signed





PATIENT: CALLI DIAMOND ACCOUNT: [id number]     MRN#: [id number]


: 1970           LOCATION: ER              AGE: 51


SEX: M                    EXAM DT: 10/03/21         ACCESSION#: [id number]


STATUS: REG ER            ORD. PHYSICIAN: CHARLEE MOTA DO


REASON: LEFT LEG SWELLING, HX OF DVT, PE,  CALLED SD@0040


PROCEDURE: VENOUS LOWER EXTREMITY LEFT





Left lower extremity venous Doppler dated 10/3/2021 2:05 AM





COMPARISON: none.





CLINICAL INDICATION: Leg swelling . History of DVT





FINDINGS:





Grayscale, color-flow and spectral waveform analysis performed to include the 

deep venous system of left lower extremity. There is normal compressibility, 

phasicity and augmentation of flow throughout. No filling defects are seen.





IMPRESSION:





No evidence of left lower extremity deep vein thrombosis.





Electronically signed by: Santosh Maldonado MD (10/3/2021 2:06 AM) [facility]














DICTATED and SIGNED BY:     SANTOSH MALDONADO MD


DATE:     10/03/21 [id number] 0





Course & Med Decision Making:


Course & Med Decision Making


Pertinent Labs and Imaging studies reviewed. (See chart for details)





Patient is a 51-year-old male who present to ER due to left lower extremity 

swelling.  Venous Doppler did not show any evidence of DVT.  Chest x-ray today 

is significantly better than chest x-ray couple weeks ago.  Patient required no 

oxygen, he was in no acute distress.  Patient was tested negative for COVID-19 

again.  He will  be discharged home and cleared to return to work immediately.





Dragon Disclaimer:


Dragon Disclaimer:


This electronic medical record was generated, in whole or in part, using a voice

 recognition dictation system.





Departure


Departure


Impression:  


   Primary Impression:  


   Peripheral edema


Disposition:   HOME / SELF CARE / HOMELESS


Condition:  STABLE


Referrals:  


KARLEY LAMB MD (PCP)


Follow up with your doctor on Monday for reevaluation.


Patient Instructions:  Peripheral Edema





Additional Instructions:  


Thank you for visiting our Emergency Department. We appreciate you trusting us 

with your care. If any additional problems come up don't hesitate to return to 

visit us. Please follow up with your primary care provider so they can plan 

additional care if needed and know about the problem that you had. If symptoms 

worsen come back to the Emergency Department. Any concerning symptoms that start

 such as chest pain, shortness of air, weakness or numbness on one side of the 

body, running high fevers or any other concerning symptoms return to the ER.











CHARLEE MOTA DO                 Oct 3, 2021 00:33

## 2021-10-03 NOTE — EKG
[facility]

              [address] [place] [address]

Test Date:    2021-10-02               Test Time:    23:58:57

Pat Name:     CALLI DIAMOND           Department:   

Patient ID:   [id number]           Room:          

Gender:       M                        Technician:   

:          1970               Requested By: CHARLEE MOTA

Order Number: [id number]           Reading MD:   Migel Salas

                                 Measurements

Intervals                              Axis          

Rate:         105                      P:            22

HI:           142                      QRS:          27

QRSD:         78                       T:            19

QT:           326                                    

QTc:          435                                    

                           Interpretive Statements

SINUS TACHYCARDIA

Electronically Signed On 10-7-2021 12:50:05 CDT by Migel Salas

## 2021-10-03 NOTE — RAD
Single view chest dated 10/3/2021 12:20 AM:



COMPARISON: 9/11/2021



Clinical Indication: Shortness of breath.



Findings:



Single upright portable exam of the chest was performed. Heart and mediastinal contours are stable. T
here is some mild patchy and linear bibasilar opacity, somewhat improved from prior study. No pleural
 effusion. No pneumothorax.



IMPRESSION: 

Improving bibasilar airspace disease.



Electronically signed by: Santosh Maldonado MD (10/3/2021 12:21 AM) MARE

## 2021-10-03 NOTE — RAD
Left lower extremity venous Doppler dated 10/3/2021 2:05 AM



COMPARISON: none.



CLINICAL INDICATION: Leg swelling . History of DVT



FINDINGS:



Grayscale, color-flow and spectral waveform analysis performed to include the deep venous system of l
eft lower extremity. There is normal compressibility, phasicity and augmentation of flow throughout. 
No filling defects are seen.



IMPRESSION:



No evidence of left lower extremity deep vein thrombosis.



Electronically signed by: Santosh Maldonado MD (10/3/2021 2:06 AM) MARE

## 2021-10-04 NOTE — NUR
IP:  IP:  Attempted to notify patient of negative COVID19 test result.  Voicemail message to 
please return call at the number provided.

## 2024-01-11 NOTE — PDOC1
History and Physical


Date of Service:


DOS:


DATE: 9/12/21 


TIME: 09:07





Chief Complaint:


Problems:  


(1) Pneumonia due to COVID-19 virus


(2) Obesity


(3) History of pulmonary embolus (PE)


Chief Complain:


Weakness, shortness of breath





History of Present Illness:


HPI:


Patient is a 51-year-old white male presented to the emergency room overnight 

due to worsening shortness of breath.  Patient does have a history of DVTs and 

pulmonary embolism currently on home Xarelto.  Patient reports that he was 

diagnosed with Covid positive a few days ago.  Initially sent home and was doing

okay.  He however, wears a CPAP at night and anytime he sleeps and the patient 

reports that he has been more short of breath despite continued use of his CPAP 

with compliance.  He says that yesterday the shortness of breath was really 

worrying him thus he was brought to the ER for admitted.  When seen today samreen

ent was reporting that his breathing was better but still not at his baseline.  

On 2 L nasal cannula does not wear home oxygen.  Would like a CPAP provided for 

when he sleeps.


Patient is wanting to get back to work as soon as possible.  I told him we would

like to keep it for at least tonight can reevaluate in the morning.  And on a 

daily basis.


He was denying any sort of headache, dizziness, vision changes, fevers, chest 

pain, abdominal pain, joint pain.





Past Medical/Surgical History:


PMH/PSH:


Significant for history of pulmonary embolism and DVT, 


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  He has been on Xarelto 


since then.  Sleep apnea.





Allergies:


Allergies:  


Coded Allergies:  


     Penicillins (Verified  Allergy, Severe, SHORTNESS OF BREATH, 5/14/18)


     iodine (Verified  Allergy, Severe, 9/11/21)


     shellfish derived (Verified  Allergy, Severe, 9/11/21)


     Sulfa (Sulfonamide Antibiotics) (Verified  Allergy, Intermediate, SWELLING,

ITCHING, 5/14/18)





Family History:


Family History:


Noncontributory





Social History:


Social History:


Denies alcohol tobacco or drug use





Current Medications:


Current Medications





Current Medications


Ceftriaxone Sodium (Rocephin) 2 gm 1X  ONCE IVP  Last administered on 9/11/21at 

23:37;  Start 9/11/21 at 23:30;  Stop 9/11/21 at 23:31;  Status DC


Azithromycin 250 ml @  250 mls/hr 1X  ONCE IV  Last administered on 9/11/21at 

23:35;  Start 9/11/21 at 23:30;  Stop 9/12/21 at 00:29;  Status DC


Sodium Chloride 1,000 ml @  1,000 mls/hr 1X  ONCE IV  Last administered on 

9/11/21at 23:35;  Start 9/11/21 at 23:30;  Stop 9/12/21 at 00:29;  Status DC


Sodium Chloride 1,000 ml @  1,000 mls/hr 1X  ONCE IV  Last administered on 

9/12/21at 01:30;  Start 9/11/21 at 23:30;  Stop 9/12/21 at 00:29;  Status DC


Ondansetron HCl (Zofran) 4 mg PRN Q8HRS  PRN IVP NAUSEA/VOMITING 1ST CHOICE;  

Start 9/12/21 at 00:15;  Stop 9/13/21 at 00:14


Fentanyl Citrate (Fentanyl 2ml Vial) 50 mcg PRN Q2HR  PRN IVP SEVERE PAIN 7-10; 

Start 9/12/21 at 00:15;  Stop 9/13/21 at 00:14


Acetaminophen (Tylenol) 650 mg PRN Q4HRS  PRN PO FEVER > 100.3'F;  Start 9/12/21

at 00:15;  Stop 9/13/21 at 00:14





Active Scripts


Active


Reported


Omeprazole 20 Mg Tablet.dr 1 Tab PO DAILY


Loratadine 10 Mg Tablet 1 Tab PO DAILY


Xarelto (Rivaroxaban) 10 Mg Tablet 1 Tab PO DAILY 10 Days


Lisinopril 10 Mg Tablet 10 Mg PO DAILY





ROS:


Review of Systems


Review of System


Negative unless noted in HPI





Physical Exam:


Vital Signs:





Vital Signs








  Date Time  Temp Pulse Resp B/P (MAP) Pulse Ox O2 Delivery O2 Flow Rate FiO2


 


9/12/21 07:00 98.4 79 20 129/89 (102) 96 Nasal Cannula 2.0 





 98.4       








Physcial Exam:


GEN:   No apparent distress.  Alert and oriented obese


HEENT:   Normal cephalic, atraumatic, external auditory canals are patent


EYES:   Extraocular muscles are intact, pupil are equally round and reactive to 

light and accommodation


MUSCULOSKELETAL:  Well developed , well nourished, good range of motion


ENDOCRINE:   No thyromegaly was palpated


LYMPHATICS:   No cervical chain or axillary nodes were noted


HEMATOPOIETIC:  No bruising


NECK:   Supple, no JVD, no thyromegaly was noted


LUNGS:   Clear to auscultation in all lung fields without rhonchi or wheezing


HEART:    RRR, S1, S2 present.  Peripheral pulses intact, no obvious murmurs 

noted


ABDOMEN:   Soft, nontender.  Positive bowel sounds, no organomegaly, normal 

bowel sounds


EXTREMITIES:   Without clubbing, cyanosis, or edema.  Pedal pulses intact.  

Negative Homans sign


NEUROLOGIC:   Normal speech and tone.  A&O x 3, moves all extremities, no 

obvious focal deficits


PSYCHIATRIC:   Normal affect, normal mood. Stable


SKIN:   No ulcerations or rashes, good skin turgor, no jaundice


VASCULAR:   Good capillary refill, neurovascular bundle appears to be intact





Labs:


Labs:





Laboratory Tests








Test


 9/11/21


22:20 9/11/21


22:26 9/12/21


00:10


 


SARS-CoV-2 Antigen (Rapid)


 Negative


(NEGATIVE) 


 





 


White Blood Count


 


 4.4 x10^3/uL


(4.0-11.0) 





 


Red Blood Count


 


 4.11 x10^6/uL


(4.30-5.70) 





 


Hemoglobin


 


 12.4 g/dL


(13.0-17.5) 





 


Hematocrit


 


 34.3 %


(39.0-53.0) 





 


Mean Corpuscular Volume  83 fL ()  


 


Mean Corpuscular Hemoglobin  30 pg (25-35)  


 


Mean Corpuscular Hemoglobin


Concent 


 36 g/dL


(31-37) 





 


Red Cell Distribution Width


 


 14.1 %


(11.5-14.5) 





 


Platelet Count


 


 231 x10^3/uL


(140-400) 





 


Neutrophils (%) (Auto)  59 % (31-73)  


 


Lymphocytes (%) (Auto)  28 % (24-48)  


 


Monocytes (%) (Auto)  10 % (0-9)  


 


Eosinophils (%) (Auto)  3 % (0-3)  


 


Basophils (%) (Auto)  1 % (0-3)  


 


Neutrophils # (Auto)


 


 2.6 x10^3/uL


(1.8-7.7) 





 


Lymphocytes # (Auto)


 


 1.2 x10^3/uL


(1.0-4.8) 





 


Monocytes # (Auto)


 


 0.4 x10^3/uL


(0.0-1.1) 





 


Eosinophils # (Auto)


 


 0.1 x10^3/uL


(0.0-0.7) 





 


Basophils # (Auto)


 


 0.0 x10^3/uL


(0.0-0.2) 





 


D-Dimer (Enriqueta)


 


 < 0.27


ug/mlFEU 





 


Sodium Level


 


 140 mmol/L


(136-145) 





 


Potassium Level


 


 3.4 mmol/L


(3.5-5.1) 





 


Chloride Level


 


 103 mmol/L


() 





 


Carbon Dioxide Level


 


 27 mmol/L


(21-32) 





 


Anion Gap  10 (6-14)  


 


Blood Urea Nitrogen


 


 17 mg/dL


(8-26) 





 


Creatinine


 


 1.7 mg/dL


(0.7-1.3) 





 


Estimated GFR


(Cockcroft-Gault) 


 42.9 


 





 


Glucose Level


 


 81 mg/dL


(70-99) 





 


Calcium Level


 


 8.6 mg/dL


(8.5-10.1) 





 


Troponin I Quantitative


 


 < 0.017 ng/mL


(0.000-0.055) 





 


NT-Pro-B-Type Natriuretic


Peptide 


 35 pg/mL


(0-124) 





 


Lactic Acid Level


 


 


 0.8 mmol/L


(0.4-2.0)








Laboratory Tests








Test


 9/11/21


22:20 9/11/21


22:26 9/12/21


00:10


 


SARS-CoV-2 Antigen (Rapid)


 Negative


(NEGATIVE) 


 





 


White Blood Count


 


 4.4 x10^3/uL


(4.0-11.0) 





 


Red Blood Count


 


 4.11 x10^6/uL


(4.30-5.70) 





 


Hemoglobin


 


 12.4 g/dL


(13.0-17.5) 





 


Hematocrit


 


 34.3 %


(39.0-53.0) 





 


Mean Corpuscular Volume  83 fL ()  


 


Mean Corpuscular Hemoglobin  30 pg (25-35)  


 


Mean Corpuscular Hemoglobin


Concent 


 36 g/dL


(31-37) 





 


Red Cell Distribution Width


 


 14.1 %


(11.5-14.5) 





 


Platelet Count


 


 231 x10^3/uL


(140-400) 





 


Neutrophils (%) (Auto)  59 % (31-73)  


 


Lymphocytes (%) (Auto)  28 % (24-48)  


 


Monocytes (%) (Auto)  10 % (0-9)  


 


Eosinophils (%) (Auto)  3 % (0-3)  


 


Basophils (%) (Auto)  1 % (0-3)  


 


Neutrophils # (Auto)


 


 2.6 x10^3/uL


(1.8-7.7) 





 


Lymphocytes # (Auto)


 


 1.2 x10^3/uL


(1.0-4.8) 





 


Monocytes # (Auto)


 


 0.4 x10^3/uL


(0.0-1.1) 





 


Eosinophils # (Auto)


 


 0.1 x10^3/uL


(0.0-0.7) 





 


Basophils # (Auto)


 


 0.0 x10^3/uL


(0.0-0.2) 





 


D-Dimer (Enriqueta)


 


 < 0.27


ug/mlFEU 





 


Sodium Level


 


 140 mmol/L


(136-145) 





 


Potassium Level


 


 3.4 mmol/L


(3.5-5.1) 





 


Chloride Level


 


 103 mmol/L


() 





 


Carbon Dioxide Level


 


 27 mmol/L


(21-32) 





 


Anion Gap  10 (6-14)  


 


Blood Urea Nitrogen


 


 17 mg/dL


(8-26) 





 


Creatinine


 


 1.7 mg/dL


(0.7-1.3) 





 


Estimated GFR


(Cockcroft-Gault) 


 42.9 


 





 


Glucose Level


 


 81 mg/dL


(70-99) 





 


Calcium Level


 


 8.6 mg/dL


(8.5-10.1) 





 


Troponin I Quantitative


 


 < 0.017 ng/mL


(0.000-0.055) 





 


NT-Pro-B-Type Natriuretic


Peptide 


 35 pg/mL


(0-124) 





 


Lactic Acid Level


 


 


 0.8 mmol/L


(0.4-2.0)











Assessment/Plan


Assessment/Plan


COVID-19 pneumonia, morbid obesity, history of pulmonary embolisms and DVTs on 

home Xarelto





-Patient previously tested Covid positive a few days ago at outside facility, 

initially was managed outpatient


-Several day history of worsening shortness of breath despite continued use of 

CPAP at night


-Start Covid treatment measures and continue for now.


-Pulmonary following


-Wean oxygen as tolerated


-Please provide patient CPAP


-Continue home Xarelto


-Otherwise Home meds resumed as indicated


-Regular diet





Justifications for Admission


Other Justification














JOAQUIN GILLIAM MD         Sep 12, 2021 09:07 VAD Note-PICC Line Insertion Using ECG- CAJ Placement    PICC Order Assessment      Patient requiring PICC Line Catheter insertion for TPN .     Pre PICC Insertion    Chart reviewed and patient assessed for any contraindications for PICC line placement.      PICC line catheter education provided to patient  prior to insertion. PICC line catheter teaching and infection prevention handouts were also given to patient . Education discussion and materials included:  Explanation of procedure  Risks and benefits  Signs and symptoms of infection and any other complications  How to prevent infection and any other complications   The importance of monitoring for infection and any other complications  The importance of promptly reporting signs, symptoms of infection, and any complications to the health care provider        PICC Line Procedure     Consent obtained from patient. Order verified and time-out completed with Radha Dixon RN at 2100 .    Utilizing ultrasound, evaluated vessel for PICC line placement. Hand Hygiene performed. Site prep completed using 2 ChloraPrep applicators. Per protocol, and using maximum barrier precautions and sterile technique, a 5 fr. BARD power Injectable triple lumen PICC Catheter was inserted into left upper arm basilic vein X 1 attempt with good initial blood return noted. Utilized ultrasound guidance to locate, evaluate, and visualize needle cannulation into the vein for PICC insertion. Catheter advanced without difficulty. 3CG technology used to identify initial negative p wave deflection. Catheter retracted to maximum P-wave amplitude with no negative deflection, indicating catheter tip in proximity to top of cavoatrial junction. Catheter ports flushed with normal saline, with brisk blood return present. Sterile caps and alcohol swab caps applied to all ports. Catheter dressing, antimicrobial patch, and StatLock securement device applied. Base line guidewire count 2  Final guidewire  count 2.  Arm circumference 29 cm at 13 cm above olecranon process. Arm circumference 30 cm at 5 cm below olecranon process. Total catheter length 45 cm  External length 0 cm.  Limb precaution band applied. Patient tolerated procedure well, no complications noted. Instruction sign placed above bed. PICC pamphlet information placed in chart. Standing orders initiated. Assisted by Radha Dixon RN . Primary nurse was notified of above.